# Patient Record
Sex: MALE | Race: NATIVE HAWAIIAN OR OTHER PACIFIC ISLANDER | URBAN - METROPOLITAN AREA
[De-identification: names, ages, dates, MRNs, and addresses within clinical notes are randomized per-mention and may not be internally consistent; named-entity substitution may affect disease eponyms.]

---

## 2021-03-18 ENCOUNTER — IMMUNIZATION (OUTPATIENT)
Dept: FAMILY MEDICINE CLINIC | Age: 56
End: 2021-03-18
Payer: COMMERCIAL

## 2021-03-18 PROCEDURE — 0001A COVID-19, PFIZER VACCINE 30MCG/0.3ML DOSE: CPT | Performed by: NURSE PRACTITIONER

## 2021-03-18 PROCEDURE — 91300 COVID-19, PFIZER VACCINE 30MCG/0.3ML DOSE: CPT | Performed by: NURSE PRACTITIONER

## 2021-04-08 ENCOUNTER — IMMUNIZATION (OUTPATIENT)
Dept: FAMILY MEDICINE CLINIC | Age: 56
End: 2021-04-08
Payer: COMMERCIAL

## 2021-04-08 PROCEDURE — 91300 COVID-19, PFIZER VACCINE 30MCG/0.3ML DOSE: CPT | Performed by: FAMILY MEDICINE

## 2021-04-08 PROCEDURE — 0002A COVID-19, PFIZER VACCINE 30MCG/0.3ML DOSE: CPT | Performed by: FAMILY MEDICINE

## 2025-02-11 ENCOUNTER — OFFICE VISIT (OUTPATIENT)
Age: 60
End: 2025-02-11

## 2025-02-11 VITALS
TEMPERATURE: 98.1 F | RESPIRATION RATE: 16 BRPM | OXYGEN SATURATION: 98 % | DIASTOLIC BLOOD PRESSURE: 76 MMHG | HEART RATE: 98 BPM | WEIGHT: 140 LBS | SYSTOLIC BLOOD PRESSURE: 136 MMHG

## 2025-02-11 DIAGNOSIS — U07.1 COVID-19: Primary | ICD-10-CM

## 2025-02-11 DIAGNOSIS — J11.1 INFLUENZA-LIKE ILLNESS: ICD-10-CM

## 2025-02-11 LAB
INFLUENZA A ANTIGEN, POC: NEGATIVE
INFLUENZA B ANTIGEN, POC: NEGATIVE
Lab: ABNORMAL
PERFORMING INSTRUMENT: ABNORMAL
QC PASS/FAIL: ABNORMAL
SARS-COV-2, POC: DETECTED

## 2025-02-11 RX ORDER — BENZONATATE 200 MG/1
200 CAPSULE ORAL 3 TIMES DAILY PRN
Qty: 21 CAPSULE | Refills: 0 | Status: SHIPPED | OUTPATIENT
Start: 2025-02-11 | End: 2025-02-18

## 2025-02-11 RX ORDER — AZITHROMYCIN 250 MG/1
TABLET, FILM COATED ORAL
Qty: 6 TABLET | Refills: 0 | Status: SHIPPED | OUTPATIENT
Start: 2025-02-11 | End: 2025-02-11 | Stop reason: CLARIF

## 2025-02-11 ASSESSMENT — ENCOUNTER SYMPTOMS
WHEEZING: 0
SORE THROAT: 0
HEMOPTYSIS: 0
COUGH: 1
CHEST TIGHTNESS: 0
SHORTNESS OF BREATH: 0
RHINORRHEA: 1
TROUBLE SWALLOWING: 0

## 2025-02-11 NOTE — PATIENT INSTRUCTIONS
- Increase clear fluids.  - Get enough rest. Make sure you are eating healthy and limit the amount of alcohol intake  - Do not smoke and limit second hand exposure.  - If you are feeling congested you can utilize Mucinex 600 mg twice daily   - Pseudophed 60 mg every 6 hours as needed.  - Zyrtec or Allegra OTC as directed.  - Follow-up with you primary care physician in 5-7 days if symptoms are not improving.

## 2025-02-11 NOTE — PROGRESS NOTES
Johana Sinclair (:  1965) is a 59 y.o. male,New patient, here for evaluation of the following chief complaint(s):  Cough      ASSESSMENT/PLAN:    ICD-10-CM    1. COVID-19  U07.1 benzonatate (TESSALON) 200 MG capsule     POCT Influenza A/B Antigen (BD Veritor)     DISCONTINUED: azithromycin (ZITHROMAX) 250 MG tablet      2. Influenza-like illness  J11.1 POCT Influenza A/B Antigen (BD Veritor)     POCT COVID-19, Antigen          Dx Disposition: Home with rest and fluids.   Education and handout provided on diagnosis and management of symptoms.   AVS reviewed with patient. Follow up as needed in UC or with PCP for new or worsening symptoms.   Return if symptoms worsen or fail to improve.    SUBJECTIVE/OBJECTIVE:  The patient has had uri symptoms for the past few days. The aptient has had a cough that has been bothersome with occasional sputum production(light color). No SOB. He denies any fever or chest pain. No abdominal pain. No N/V/D. No known exposure to illness.       History provided by:  Patient   used: No    Cough  This is a new problem. The current episode started in the past 7 days. The problem has been waxing and waning. The problem occurs constantly. The cough is Productive of sputum. Associated symptoms include nasal congestion, postnasal drip and rhinorrhea. Pertinent negatives include no chest pain, chills, ear congestion, ear pain, fever, headaches, hemoptysis, myalgias, sore throat, shortness of breath or wheezing. Nothing aggravates the symptoms. He has tried nothing for the symptoms. There is no history of asthma or COPD.       Vitals:    25 1046   BP: 136/76   Pulse: 98   Resp: 16   Temp: 98.1 °F (36.7 °C)   TempSrc: Oral   SpO2: 98%   Weight: 63.5 kg (140 lb)       Review of Systems   Constitutional:  Negative for chills and fever.   HENT:  Positive for postnasal drip and rhinorrhea. Negative for congestion, ear pain, sore throat and trouble swallowing.    Respiratory: